# Patient Record
Sex: FEMALE
[De-identification: names, ages, dates, MRNs, and addresses within clinical notes are randomized per-mention and may not be internally consistent; named-entity substitution may affect disease eponyms.]

---

## 2023-10-14 ENCOUNTER — NURSE TRIAGE (OUTPATIENT)
Dept: OTHER | Facility: CLINIC | Age: 58
End: 2023-10-14

## 2023-10-14 NOTE — TELEPHONE ENCOUNTER
Location of patient: [place]    Subjective: Caller states \"I mixed my bolus and basil this morning 23 units of fast acting Blood sugar is 148 currently - currently eating m&m;s  Patient is not alone. .\"     Current Symptoms: no current symptoms - shaky because she is scared. Onset: 10  minutes ago; sudden    Associated Symptoms: NA    Pain Severity: 0/10; N/A; none    Temperature: n/a  n/a    What has been tried: eating m&m's    LMP: NA Pregnant: NA    Recommended disposition:  Call poison control now    Care advice provided, patient verbalizes understanding; denies any other questions or concerns; instructed to call back for any new or worsening symptoms. Patient is calling poison control now. This triage is a result of a call to [address] [place] [address]. Please do not respond to the triage nurse through this encounter. Any subsequent communication should be directly with the patient.         Reason for Disposition   MORE THAN A DOUBLE DOSE of a prescription or over-the-counter (OTC) drug    Protocols used: Medication Question Call-ADULT-[place]